# Patient Record
(demographics unavailable — no encounter records)

---

## 2024-12-16 NOTE — PROCEDURE
[FreeTextEntry6] : Procedure performed: Nasal Endoscopy- Diagnostic Pre-op indication(s): nasal congestion Post-op indication(s): nasal congestion  Verbal and/or written consent obtained from patient Anterior rhinoscopy insufficient to account for symptoms Scope #: 3,  flexible fiber optic telescope  The scope was introduced in the nasal passage between the middle and inferior turbinates to exam the inferior portion of the middle meatus and the fontanelle, as well as the maxillary ostia.  Next, the scope was passed medically and posteriorly to the middle turbinates to examine the sphenoethmoid recess and the superior turbinate region. Upon visualization the finders are as follows: Nasal Septum: sigmoidal septal deviation Bilateral - Mucosa: severe boggy turbinates, Mucous: scant, Polyp: , Inferior Turbinate: boggy, Middle Turbinate: normal, Superior Turbinate: normal, Inferior Meatus: narrow, Middle Meatus: narrow, Super Meatus:normal, Sphenoethmoidal Recess: clear

## 2024-12-16 NOTE — END OF VISIT
[FreeTextEntry3] : I personally saw and examined  the patient in detail.  I spoke to MAKEDA Merrill regarding the assessment and plan of care. I performed the procedures and relevant physical exam.  I have reviewed the above assessment and plan of care and I agree.  I have made changes to the body of the note wherever necessary and appropriate

## 2024-12-16 NOTE — ASSESSMENT
[FreeTextEntry1] : pt with severe nasal congestion with chronic sinusitis and severe edema/polypoid CT scan from [facility] reviewed- pansinusitis disease  Allergy evaluation and possible treatments discussed. Additionally, we will proceed with a regiment of decongestants and medication to reduce inflammation and an extended course of antibiotics. The patient was instructed to continue nasal irrigation and topical steroid spray for over 1 month. The goal is to try to break the cycle of inflammation and obstruction leading to resolution of the acute and chronic symptoms. It will hopefully allow for maintenance therapy to reach disease areas and avoid further intervention.  Pt with nasal obstruction with afrin use and exam suggestive of rhinitis medicamentosa with significant nasal edema. also has some SND but likely more due to edema.  Pt needs to stop afrin and get nose back to baseline before able to get effective treatment or consider surgical procedure. Reviewed with pt risks of afrin use and damage it can cause to the nose as well as resistance they will continue to develop and dependance.  they agreed to stop and understand it will be difficult and cause congestion even with medical Tx will start on flonase, oral steroids, as well as hypertonic saline  once off the afrin for for a few months and on maintenance will further evaluate if intervention is needed.

## 2024-12-16 NOTE — HISTORY OF PRESENT ILLNESS
[de-identified] : 67 year old female presents with constant stuffy nose and difficulty breathing for last 5 years. States gets sinus headache at forehead every once in a while. Most bothered by stuffy nose, feels blocked. Denies runny nose. States only nasal spray that works is afrin. Used it last night, been using on and off for a month or two at a time for 2-3 years. Has been allergy tested, all negative. No courses of antibiotics or steroids in last 12 months that she recalls. was given one coure maria luisa antibiotics for tooth being pulled - amoxicillin, states its not helping breathing or stuffiness.  used flonase for years uses navage and autumn pot last few months - when too clogged does not work

## 2024-12-16 NOTE — CONSULT LETTER
[FreeTextEntry1] : Dear Dr. DAHLIA AGUDELO  I had the pleasure of evaluating your patient JACQUELYN ANTHONY, thank you for allowing us to participate in their care. please see full note detailing our visit below. If you have any questions, please do not hesitate to call me and I would be happy to discuss further.   Giovanni Pruitt M.D. Attending Physician,   Department of Otolaryngology - Head and Neck Surgery [facility]  Office: [phone] Fax: [phone]

## 2025-01-27 NOTE — CONSULT LETTER
[FreeTextEntry1] : Dear Dr. DAHLIA AGUDELO  I had the pleasure of evaluating your patient JACQUELYN ANTHONY, thank you for allowing us to participate in their care. please see full note detailing our visit below. If you have any questions, please do not hesitate to call me and I would be happy to discuss further.   Giovanni Pruitt M.D. Attending Physician,   Department of Otolaryngology - Head and Neck Surgery [facility]  Office: [phone] Fax: [phone]

## 2025-01-27 NOTE — ASSESSMENT
[FreeTextEntry1] : 67 year old female pt with severe nasal congestion with chronic sinusitis. On exam, some polypoid tissue b/l and BITH.  Been off Afrin since last visit CT scan from [facility] reviewed 09/03/24- pansinusitis disease nearly opacified - opacified throughout.   Patient with chronic sinusitis for over 3 months that has been refractory to medical management. They have had over two courses of treatment including by me with extended course of consecutive treatment, PO steroids. They also tried medicated nasal sprays and irrigation over a one-month duration. We discussed allergy testing and treatment. On reviewed of the CT scan of their sinuses that showed evidence of sinus disease in the following sinuses: pansinus   Discussed options: 1) continue conservative management with nasal sprays and washes, course of abx and steroids  2) office sinus procedure  3) sleeping procedure  - patient elected for option 2, will book for procedure  - would also like another course of abx and steroids  - continue Flonase. A topical steroid reduce mucosal swelling, illustrated appropriate use and how to reduce the risk of bleeding    - Nasal irrigation and showed how to use it to maximize effectiveness  - Risks benefits and alternatives of endoscopic sinus surgery with possible image guidance, septoplasty bilateral inferior turbinate reduction discussed with patient at length. Risks discussed include but were not limited to bleeding, infection, persistent symptoms, scarring, injury to the skull base and brain and CSF leak, injury to orbit and eye, crusting, septal hematoma, septal perforation results in whistling, empty nose syndrome, crusting and bleeding as well as continued nasal obstruction etc.  were discussed. For polyps, discussed very high recurrence rate that require future surveillance, maintenance and likelihood of need for further procedures. Also discussed option of office balloon/hybrid balloon dilation and office sinus surgery - similar risk profile, will not address septum/ turbs and takes a generally more conservative approach with quicker recovery, however higher possibility for need for future intervention. can do additional; turbs/septum in the future if needed  not taking bloodthinners, pt will talk to cards for clearance as recently had a workup. Diabetic on metformin  nkda

## 2025-01-27 NOTE — HISTORY OF PRESENT ILLNESS
[de-identified] : 67 year old female presents with constant stuffy nose and difficulty breathing for last 5 years. States gets sinus headache at forehead every once in a while. Most bothered by stuffy nose, feels blocked. Denies runny nose. States only nasal spray that works is afrin. Used it last night, been using on and off for a month or two at a time for 2-3 years. Has been allergy tested, all negative. No courses of antibiotics or steroids in last 12 months that she recalls. was given one course maria luisa antibiotics for tooth being pulled - amoxicillin, states its not helping breathing or stuffiness.  used flonase for years uses navage and autumn pot last few months - when too clogged does not work  [FreeTextEntry1] : Patient is following up after taking course of oral antibiotics and oral steroids and using washes with saline twice a day. Course improved stuffiness and pressure at forehead for a brief time, now stuffy again and pressure came back. States has been off afrin for over a month. Using flonase once a day for over last month, also using continuous nasal saline irrigation for over a month. Was allergy tested in September which was negative.

## 2025-01-27 NOTE — PROCEDURE
[FreeTextEntry6] : Procedure performed: Nasal Endoscopy- Diagnostic Pre-op indication(s): nasal congestion Post-op indication(s): nasal congestion  Verbal and/or written consent obtained from patient Anterior rhinoscopy insufficient to account for symptoms Scope #: 3,  flexible fiber optic telescope  The scope was introduced in the nasal passage between the middle and inferior turbinates to exam the inferior portion of the middle meatus and the fontanelle, as well as the maxillary ostia.  Next, the scope was passed medically and posteriorly to the middle turbinates to examine the sphenoethmoid recess and the superior turbinate region. Upon visualization the finders are as follows: Nasal Septum: sigmoidal septal deviation Bilateral - Mucosa: severe boggy turbinates, Mucous: scant, Polyp: +b/l, Inferior Turbinate: boggy, Middle Turbinate: normal, Superior Turbinate: normal, Inferior Meatus: narrow, Middle Meatus: narrow, Super Meatus:normal, Sphenoethmoidal Recess: clear

## 2025-02-18 NOTE — PROCEDURE
[de-identified] :     Procedure - Image guided endoscopic sinus surgery; bilateral maxillary, frontal, sphenoid balloon sinuplasty; bilateral ethmoidectomy Pre-op Dx - chronic sinusitis Post op Dx - same Attending Surgeon: Giovanni Pruitt MD Assistant - Lucy Merrill EBL 10 cc Findings: Thick secretions and inflamed mucosa Specimen: left and right ethmoid Risks benefits and alternatives of endoscopic sinus surgery and discussed with patient at length. Risks discussed include but were not limited to bleeding, infection, persistent symptoms, scarring, injury to the skull base and brain and CSF leak, injury to orbit, crusting and bleeding as well as continued nasal obstruction etc. were discussed. Patient verbalized understanding of risks and benefits and also asked probing follow up questions which were answered to clarify details and get full understanding. pt signed consent. Vitals preformed medication taken after consent Bilateral nasal cavities decongested with Afrin and tropicalized with 4% lido on a maritza Movity image guidance registered and verified and seen to be accurate After some time nasal cavity explored. bilateral sphenopalatine block and sphenoid face injection preformed on right after drawing back to avoid intravascular injection, middle turbinate and maxillary line injected with Lidocaine with Epi 1%/ 1:200,000., total of 5cc Some time given for anesthesia and vasoconstriction. Sphenoid dilation: Right sphenoid ostium was approached with image guided balloon, under direct visualization it was probed the sinus and confirmed placement with image guidance and anatomical verification. The ostium was dilated, and sinus suctioned Going to the left side, the sphenoid ostium was approached with image guided balloon, under direct visualization it was probed the sinus and confirmed placement with image guidance and anatomical verification. The ostium was dilated, and sinus suctioned Maxillary dilation:   Right middle turbinate then visualized, it was blanched and so was medialized with a freer elevator to expose the maxillary ostium and full uncinate process. The uncinate and medial maxillary wall visualized. using Movity IG balloon maxillary ostium probed and the maxillary sinus was entered and confirmed with image guidance and anatomical verification. Ostium dilated with balloon and ostium widened. Maxillary sinus suctioned and irrigated out. Going to the left side, the middle turbinate then visualized, it was blanched and so was medialized with a freer elevator to expose the maxillary ostium and full uncinate process. The uncinate and medial maxillary wall visualized. using Celso IG balloon maxillary ostium probed and the maxillary sinus was entered and confirmed with image guidance and anatomical verification. Ostium dilated with balloon and ostium widened. Maxillary sinus suctioned and irrigated out. Ethmoid; On the right side I then removed the remaining uncinate with an upgoing Blakesley and entered the ethmoid bulla with Blakesley. I cleared the obstructed cells and tracked back to the skull base inferiorly using a 4mm image guided mircroderbider. I then tracked the skull base anteriorly clearing the inflamed ethmoids cells to the frontal outflow track being careful to avoid the skull base superiorly and lamina laterally. Moving to the left side I removed the remaining uncinate with an upgoing Blakesley and entered the ethmoid bulla with Blakesley. I cleared the obstructed cells and tracked back to the skull base inferiorly using a 4mm image guided mircroderbider. I then tracked the skull base anteriorly clearing the inflamed ethmoids cells to the frontal outflow track being careful to avoid the skull base superiorly and lamina laterally. Frontal dilation: On right side under direct visualization, I probed it with the balloon probe with the frontal bend and with some gentle maneuvering was able to get into the sinus - very small sinus. Location was verified with image guidance and tactile feedback. We then dilated the ostium and suctioned out the sinus Moving over the left side under direct visualization, I probed it with the balloon probe with the frontal bend and with some gentle maneuvering was able to get into the sinus - very small sinus. Location was verified with image guidance and tactile feedback. We then dilated the ostium and suctioned out the sinus       Ostiums were then packed with nasopore coated with steroid ointment and Bacitracin     Patient tolerated procedure well     Advised and reviewed precautions at length. handout given. advised to call or come in if any concerns.

## 2025-02-25 NOTE — REASON FOR VISIT
[Post-Operative Visit] : a post-operative visit [FreeTextEntry2] : s/p bilateral maxillary, frontal, sphenoid balloon sinuplasty; bilateral ethmoidectomy 02/18/25

## 2025-02-25 NOTE — PROCEDURE
[FreeTextEntry3] : procedure - bilateral endoscopic nasal debridement   Dx - crusting/ polypoid tissue    Verbal consent obtained - discussed risks and benefits of intervention before proceeding    Bilateral nasal cavities inspected with #0 ridged sinus endoscope. septum appeared midline and turbinates well reduced. bilateral middle meatuses were explored and suction and agitator were used to open ostiums and open any forming scar bands. all sinuses were explored and open at end of procedure

## 2025-02-25 NOTE — CONSULT LETTER
[FreeTextEntry1] : Dear Dr. DAHLIA AGUDELO    I had the pleasure of evaluating your patient JACQUELYN ANTHONY, thank you for allowing us to participate in their care. please see full note detailing our visit below.  If you have any questions, please do not hesitate to call me and I would be happy to discuss further.       Giovanni Pruitt M.D.   Attending Physician,     Department of Otolaryngology - Head and Neck Surgery   [facility]    Office: [phone]   Fax: [phone]

## 2025-02-25 NOTE — HISTORY OF PRESENT ILLNESS
[de-identified] : 68 year old female following up 1 week s/p bilateral maxillary, frontal, sphenoid balloon sinuplasty; bilateral ethmoidectomy 02/18/25. Doing well, doing washes 2-3 times a day.  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

## 2025-02-25 NOTE — CONSULT LETTER
[FreeTextEntry1] : Dear Dr. DAHLIA AGUDELO    I had the pleasure of evaluating your patient JACQUELYN ANTHONY, thank you for allowing us to participate in their care. please see full note detailing our visit below.  If you have any questions, please do not hesitate to call me and I would be happy to discuss further.       Giovanni Pruitt M.D.   Attending Physician,     Department of Otolaryngology - Head and Neck Surgery   [facility]    Office: [phone]   Fax: [phone]

## 2025-02-25 NOTE — ASSESSMENT
[FreeTextEntry1] : 68 year old female following up s/p bilateral maxillary, frontal, sphenoid balloon sinuplasty; bilateral ethmoidectomy 02/18/25. The patient was debrided bilaterally with rigid suction as a result of nasal crusting. breathing much improved after detriment. Patient should continue to avoid nose blowing, heavy lifting or exercising, and should start a regimen of over the counter nasal saline spray for moisturization of the nasal cavities.  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

## 2025-04-03 NOTE — CONSULT LETTER
[FreeTextEntry1] : Dear Dr. DAHLIA AGUDELO    I had the pleasure of evaluating your patient JACQUELYN ANTHONY, thank you for allowing us to participate in their care. please see full note detailing our visit below.  If you have any questions, please do not hesitate to call me and I would be happy to discuss further.       Giovanni Pruitt M.D.   Attending Physician,     Department of Otolaryngology - Head and Neck Surgery   [facility]    Office: [phone]   Fax: [phone]

## 2025-04-03 NOTE — PROCEDURE
[FreeTextEntry3] : procedure - bilateral endoscopic nasal debridement   Dx - crusting/ polypoid tissue    Verbal consent obtained - discussed risks and benefits of intervention before proceeding    Bilateral nasal cavities inspected with #0 ridged sinus endoscope. septum appeared midline and turbinates well reduced. bilateral middle meatuses were explored and suction and agitator were used to open ostiums and open any forming scar bands. all sinuses were explored and open at end of procedure polypoid changes L>R  Procedure- removal of cerumen bilaterally Diagnosis - cerumen impaction bilateral ears found to have impacted cerumen - they were cleared with suction and curette, canals appeared normal.

## 2025-04-03 NOTE — PHYSICAL EXAM
[Normal] : tympanic membranes are normal in both ears [de-identified] : b/l CI [de-identified] : crusting b/l

## 2025-04-03 NOTE — ASSESSMENT
[FreeTextEntry1] : 68 year old female following up s/p bilateral maxillary, frontal, sphenoid balloon sinuplasty; bilateral ethmoidectomy 02/18/25. The patient was debrided bilaterally with rigid suction as a result of nasal crusting. breathing much improved after detriment. Patient should continue a regimen of over the counter nasal saline spray for moisturization of the nasal cavities. 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 On exam, bilateral impacted cerumen, removed with curette.  - patient declined audiogram  - ear hygiene - discussed preventive measures and signs of accumulation

## 2025-04-03 NOTE — HISTORY OF PRESENT ILLNESS
[de-identified] : 68 year old female following up 1 week s/p bilateral maxillary, frontal, sphenoid balloon sinuplasty; bilateral ethmoidectomy 02/18/25. Doing well, doing washes 2-3 times a day.  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  [FreeTextEntry1] : Patient is following up s/p bilateral maxillary, frontal, sphenoid balloon sinuplasty; bilateral ethmoidectomy 02/18/25. Dong well, states had a cold last week. Stuffy on right today, otherwise no sinus pressure or stuffy nose. Doing washes with saline once a day. States last night wash burned a little but completed this morning, states wash this morning felt fine.   During cold left ear felt like had liquid, heard a swish. Right ear felt clogged up until yesterday. Hearing now is improved. no Vertigo, tinnitus, pain, drainage or facial weakness.

## 2025-05-13 NOTE — CONSULT LETTER
[FreeTextEntry1] : Dear Dr. DAHLIA AGUDELO    I had the pleasure of evaluating your patient JACQUELYN ANTHONY, thank you for allowing us to participate in their care. please see full note detailing our visit below.  If you have any questions, please do not hesitate to call me and I would be happy to discuss further.       Giovanni Pruitt M.D.   Attending Physician,     Department of Otolaryngology - Head and Neck Surgery   [facility]    Office: [phone]   Fax: [phone]

## 2025-05-13 NOTE — HISTORY OF PRESENT ILLNESS
[de-identified] : 68 year old female following up 1 week s/p bilateral maxillary, frontal, sphenoid balloon sinuplasty; bilateral ethmoidectomy 02/18/25. Doing well, doing washes 2-3 times a day.  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   Patient is following up s/p bilateral maxillary, frontal, sphenoid balloon sinuplasty; bilateral ethmoidectomy 02/18/25. Dong well, states had a cold last week. Stuffy on right today, otherwise no sinus pressure or stuffy nose. Doing washes with saline once a day. States last night wash burned a little but completed this morning, states wash this morning felt fine.   During cold left ear felt like had liquid, heard a swish. Right ear felt clogged up until yesterday. Hearing now is improved. no Vertigo, tinnitus, pain, drainage or facial weakness. [FreeTextEntry1] : Patient is following up s/p bilateral maxillary, frontal, sphenoid balloon sinuplasty; bilateral ethmoidectomy 02/18/25. Doing washes twice a day with budesonide. no more sinus pressure or pain, breathing much better, happy with results   States ears feel clogged at times. With occasional hard of hearing. no Vertigo, tinnitus, pain, drainage or facial weakness.

## 2025-05-13 NOTE — PHYSICAL EXAM
[Normal] : inferior turbinates and middle turbinates are normal [de-identified] : b/l CI [de-identified] : crusting b/l

## 2025-05-13 NOTE — PROCEDURE
[FreeTextEntry3] : procedure - bilateral endoscopic nasal debridement   Dx - crusting/ polypoid tissue    Verbal consent obtained - discussed risks and benefits of intervention before proceeding    Bilateral nasal cavities inspected with #0 ridged sinus endoscope. septum appeared midline and turbinates well reduced. bilateral middle meatuses were explored and suction and agitator were used to open ostiums and open any forming scar bands. all sinuses were explored and open at end of procedure  Procedure- removal of cerumen bilaterally Diagnosis - cerumen impaction bilateral ears found to have impacted cerumen - they were cleared with suction and curette, canals appeared normal.

## 2025-05-13 NOTE — ASSESSMENT
[FreeTextEntry1] : 68 year old female following up s/p bilateral maxillary, frontal, sphenoid balloon sinuplasty; bilateral ethmoidectomy 02/18/25. The patient was debrided bilaterally with rigid suction as a result of nasal crusting. breathing much improved after detriment. Patient should continue a regimen of over the counter nasal saline spray for moisturization of the nasal cavities. Minimal polypoid changes.  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 On exam, bilateral cerumen, cleared with curette.  - consider audio next visit, pt declined today. feels hears OK now  - ear hygiene - discussed preventive measures and signs of accumulation